# Patient Record
Sex: MALE | Race: WHITE
[De-identification: names, ages, dates, MRNs, and addresses within clinical notes are randomized per-mention and may not be internally consistent; named-entity substitution may affect disease eponyms.]

---

## 2020-01-29 ENCOUNTER — HOSPITAL ENCOUNTER (EMERGENCY)
Dept: HOSPITAL 72 - EMR | Age: 30
Discharge: HOME | End: 2020-01-29
Payer: SELF-PAY

## 2020-01-29 VITALS — SYSTOLIC BLOOD PRESSURE: 113 MMHG | DIASTOLIC BLOOD PRESSURE: 73 MMHG

## 2020-01-29 VITALS — BODY MASS INDEX: 26.24 KG/M2 | HEIGHT: 73 IN | WEIGHT: 198 LBS

## 2020-01-29 VITALS — DIASTOLIC BLOOD PRESSURE: 70 MMHG | SYSTOLIC BLOOD PRESSURE: 120 MMHG

## 2020-01-29 DIAGNOSIS — F17.200: ICD-10-CM

## 2020-01-29 DIAGNOSIS — Z88.0: ICD-10-CM

## 2020-01-29 DIAGNOSIS — Z72.811: ICD-10-CM

## 2020-01-29 DIAGNOSIS — M54.9: Primary | ICD-10-CM

## 2020-01-29 PROCEDURE — 99282 EMERGENCY DEPT VISIT SF MDM: CPT

## 2020-01-29 NOTE — NUR
ED Nurse Note:

Patietn discharged from ED with a steady gait however patient started to raise 
voice at multiple staff members and doctor. patient voiced out that whoever 
touches him will get kicked. patient refused to sign homeless log, provided 
food. patients clothing is adequate for weather.

## 2020-01-29 NOTE — EMERGENCY ROOM REPORT
History of Present Illness


General


Chief Complaint:  Back Pain-No Injury


Source:  EMS





Present Illness


HPI


Patient is brought in by EMS.  They say he was complaining about lower back 

pain.  The patient refused to give a history at this time.  Unclear as to who 

requested 911 assistance.  Patient from the streets.





The patient is refusing to answer questions.  He says he just wants to rest and 

sleep.


Allergies:  


Coded Allergies:  


     PENICILLINS (Verified  Allergy, Unknown, 1/29/20)





Patient History


Past Medical History:  see triage record


Social History:  Reports: smoking


Social History Narrative


Homeless


Reviewed Nursing Documentation:  PMH: Agreed; PSxH: Agreed





Review of Systems


All Other Systems:  limited





Physical Exam





Vital Signs








  Date Time  Temp Pulse Resp B/P (MAP) Pulse Ox O2 Delivery O2 Flow Rate FiO2


 


1/29/20 01:43 98.2 89 20 113/73 (86) 98 Room Air  








Sp02 EP Interpretation:  reviewed, normal


General Appearance:  well appearing, no apparent distress, non-toxic, other - 

Sleepy


Head:  normocephalic


Eyes:  bilateral eye PERRL, bilateral eye EOMI, bilateral eye Scleral Injection


ENT:  moist mucus membranes


Neck:  full range of motion, supple


Respiratory:  chest non-tender, lungs clear, normal breath sounds


Cardiovascular #1:  regular rate, rhythm


Cardiovascular #2:  2+ radial (R)


Gastrointestinal:  normal inspection, normal bowel sounds, non tender, no mass, 

non-distended


Musculoskeletal:  back normal - Spine palpated, no deformity, no reaction to 

palpation, normal range of motion


Neurologic:  alert, motor strength/tone normal, DTRs symmetric, sensory intact


Psychiatric:  other - Sleepy and refusing to answer questions


Skin:  no rash, warm/dry





Medical Decision Making


Homeless Attestation


I, The treating physician Dr. Peter, have assessed and agree that patient is 

medically stable for discharge to an outpatient disposition.


Diagnostic Impression:  


 Primary Impression:  


 Back pain


 Qualified Codes:  M54.9 - Dorsalgia, unspecified


 Additional Impressions:  


 Antisocial behavior


 Suspect substance abuse


ER Course


Patient presents via EMS with alleged back pain.  Patient is refusing to answer 

questions and comply with evaluation.  Differential includes back strain, back 

contusion, UTI, substance abuse amongst others.  Urine ordered.  Tylenol 

ordered also.





Patient refuses to give urine.  He declines to take Tylenol.





Will observe.





Patient slept soundly for many hours.  When awakened to discuss the problem he 

says he has 9/10 pain in his lower back.  He states that he has bone spurs.  He 

is insisting we do something about it and take care of him but refuses labs x-

rays urine and medication.  When offered these he starts to become abusive with 

staff.  He states we have to do something but refuses help.  He denies suicidal 

ideation.  Based on the initial presentation and injected sclera substance 

abuse is highly suspected.  Attempt to get Accu-Chek.  Patient refuses this 

also.





Patient is ambulatory.  Able to sit and stand without difficulty.





No medical emergency.





As patient is refusing care patient is discharged AGAINST MEDICAL ADVICE.  

Advised of risk of death.  Patient abusive when advisement given.





Last Vital Signs








  Date Time  Temp Pulse Resp B/P (MAP) Pulse Ox O2 Delivery O2 Flow Rate FiO2


 


1/29/20 06:00 98.2 76 20 120/70 98 Room Air  








Status:  improved


Disposition:  AGAINST MEDICAL ADVICE


Condition:  Stable


Scripts


Acetaminophen (Tylenol) 325 Mg Tablet


650 MG ORAL Q6H PRN for Prn Pain/Headache/Temp > 101, #20 TAB 0 Refills


   Prov: William Peter MD         1/29/20


Referrals:  


NOT CHOSEN [facility]/MD,REFERRING (PCP)











William Peter MD Jan 29, 2020 05:46

## 2020-01-29 NOTE — NUR
ED Nurse Note:

Patient brought in by RA from the [facility] c/o lower back pain. patient rates 
his pain a 2/10 pain. non traumatic, states that he would just like to sleep. 
will wait for further orders